# Patient Record
Sex: MALE | URBAN - METROPOLITAN AREA
[De-identification: names, ages, dates, MRNs, and addresses within clinical notes are randomized per-mention and may not be internally consistent; named-entity substitution may affect disease eponyms.]

---

## 2023-10-03 ENCOUNTER — OFFICE VISIT (OUTPATIENT)
Dept: URBAN - METROPOLITAN AREA CLINIC 113 | Facility: CLINIC | Age: 33
End: 2023-10-03
Payer: COMMERCIAL

## 2023-10-03 ENCOUNTER — LAB OUTSIDE AN ENCOUNTER (OUTPATIENT)
Dept: URBAN - METROPOLITAN AREA CLINIC 113 | Facility: CLINIC | Age: 33
End: 2023-10-03

## 2023-10-03 VITALS
SYSTOLIC BLOOD PRESSURE: 121 MMHG | TEMPERATURE: 97.5 F | DIASTOLIC BLOOD PRESSURE: 79 MMHG | HEIGHT: 72 IN | WEIGHT: 195 LBS | HEART RATE: 66 BPM | BODY MASS INDEX: 26.41 KG/M2 | RESPIRATION RATE: 18 BRPM

## 2023-10-03 DIAGNOSIS — R10.84 GENERALIZED ABDOMINAL PAIN: ICD-10-CM

## 2023-10-03 DIAGNOSIS — R14.0 ABDOMINAL BLOATING: ICD-10-CM

## 2023-10-03 DIAGNOSIS — R19.8 RECTAL PRESSURE: ICD-10-CM

## 2023-10-03 PROCEDURE — 99244 OFF/OP CNSLTJ NEW/EST MOD 40: CPT | Performed by: NURSE PRACTITIONER

## 2023-10-03 PROCEDURE — 99204 OFFICE O/P NEW MOD 45 MIN: CPT | Performed by: NURSE PRACTITIONER

## 2023-10-03 RX ORDER — AMOXICILLIN AND CLAVULANATE POTASSIUM 875; 125 MG/1; MG/1
1 TABLET TABLET, FILM COATED ORAL
Qty: 20 | Refills: 0 | OUTPATIENT
Start: 2023-10-03 | End: 2023-10-13

## 2023-10-03 RX ORDER — WARFARIN SODIUM 3 MG/1
1 TABLET TABLET ORAL ONCE A DAY
Status: ACTIVE | COMMUNITY

## 2023-10-03 RX ORDER — METOPROLOL TARTRATE 25 MG/1
1 TABLET WITH FOOD TABLET, FILM COATED ORAL TWICE A DAY
Status: ACTIVE | COMMUNITY

## 2023-10-03 NOTE — HPI-TODAY'S VISIT:
33-year-old male with a history of hypothyroidism, aortic valve replacement on Coumadin, referred by Dr. Dee Urban for evaluation of postprandial abdominal pain and bloating. He complains of intermittent abdominal pain, excess gas and abdominal bloating which has been ongoing for years, but worsening over the last several months.  He states his symptoms are not necessarily related to meals but progressed throughout the day.  This is beginning to interfere with his work and quality of life.  He denies any difficulty with constipation or diarrhea, stating he has regular bowel movements.  He denies any blood in the stool.  He does experience occasional rectal pressure.  He was experiencing intermittent reflux symptoms and difficulty clearing his throat which resolved with initiation of omeprazole per his PCP.  Unfortunately, the gaseousness was unchanged with initiation of acid suppression.  He had recent labs with his PCP, to include celiac testing, which was reportedly unremarkable.  He has not had any abdominal imaging. He took a round of antibiotics last year for an upper respiratory infection, but none recently.

## 2023-12-15 ENCOUNTER — OFFICE VISIT (OUTPATIENT)
Dept: URBAN - METROPOLITAN AREA CLINIC 107 | Facility: CLINIC | Age: 33
End: 2023-12-15
Payer: COMMERCIAL

## 2023-12-15 ENCOUNTER — DASHBOARD ENCOUNTERS (OUTPATIENT)
Age: 33
End: 2023-12-15

## 2023-12-15 VITALS
SYSTOLIC BLOOD PRESSURE: 135 MMHG | HEART RATE: 68 BPM | TEMPERATURE: 97.4 F | DIASTOLIC BLOOD PRESSURE: 65 MMHG | BODY MASS INDEX: 26.41 KG/M2 | WEIGHT: 195 LBS | HEIGHT: 72 IN

## 2023-12-15 DIAGNOSIS — R14.0 ABDOMINAL BLOATING: ICD-10-CM

## 2023-12-15 DIAGNOSIS — R10.84 GENERALIZED ABDOMINAL PAIN: ICD-10-CM

## 2023-12-15 DIAGNOSIS — R19.8 RECTAL PRESSURE: ICD-10-CM

## 2023-12-15 PROCEDURE — 99214 OFFICE O/P EST MOD 30 MIN: CPT | Performed by: INTERNAL MEDICINE

## 2023-12-15 RX ORDER — HYOSCYAMINE SULFATE 0.12 MG/1
1 TABLET UNDER THE TONGUE AND ALLOW TO DISSOLVE  AS NEEDED TABLET, ORALLY DISINTEGRATING ORAL THREE TIMES A DAY
Qty: 40 | Refills: 2 | OUTPATIENT
Start: 2023-12-15 | End: 2024-06-12

## 2023-12-15 RX ORDER — WARFARIN SODIUM 3 MG/1
1 TABLET TABLET ORAL ONCE A DAY
Status: ACTIVE | COMMUNITY

## 2023-12-15 RX ORDER — METOPROLOL TARTRATE 25 MG/1
1 TABLET WITH FOOD TABLET, FILM COATED ORAL TWICE A DAY
Status: ACTIVE | COMMUNITY

## 2023-12-15 NOTE — HPI-TODAY'S VISIT:
Mr Avina is a 33-year-old male with a history of hypothyroidism, aortic valve replacement on Coumadin, initially referred by Dr. Dee Urban for evaluation of postprandial abdominal pain and bloating.  CT abdomen pelvis with contrast 11/15/2023 revealed a normal liver, normal gallbladder, normal pancreas and no acute pathology.  He was empirically treated for bacterial overgrowth with Augmentin for 7 days.  He did not see any change in his symptoms.  He continues to complain of abdominal bloating and excessive gas mainly during the workweek.  He feels that stress also increases the symptoms.  His symptoms are better on the weekends.  He has been using IBgard a few times a week which does help.  He has some mild lower abdominal discomfort relieved with defecation or passing flatus.  He otherwise denies severe abdominal pain, nausea, vomiting, change in bowel habits or blood in the stool.  He is trying to make dietary changes.  He has run out of his levothyroxine for about 3 weeks.  He has not had repeat thyroid levels.  Previous celiac testing was negative in May.

## 2024-01-09 ENCOUNTER — OFFICE VISIT (OUTPATIENT)
Dept: URBAN - METROPOLITAN AREA CLINIC 113 | Facility: CLINIC | Age: 34
End: 2024-01-09